# Patient Record
Sex: FEMALE | NOT HISPANIC OR LATINO | Employment: FULL TIME | URBAN - NONMETROPOLITAN AREA
[De-identification: names, ages, dates, MRNs, and addresses within clinical notes are randomized per-mention and may not be internally consistent; named-entity substitution may affect disease eponyms.]

---

## 2018-08-28 ENCOUNTER — EVALUATION (OUTPATIENT)
Dept: PHYSICAL THERAPY | Facility: CLINIC | Age: 48
End: 2018-08-28
Payer: COMMERCIAL

## 2018-08-28 DIAGNOSIS — S76.312D RUPTURE OF LEFT PROXIMAL HAMSTRING TENDON, SUBSEQUENT ENCOUNTER: Primary | ICD-10-CM

## 2018-08-28 PROCEDURE — 97010 HOT OR COLD PACKS THERAPY: CPT | Performed by: PHYSICAL THERAPIST

## 2018-08-28 PROCEDURE — G8979 MOBILITY GOAL STATUS: HCPCS | Performed by: PHYSICAL THERAPIST

## 2018-08-28 PROCEDURE — 97110 THERAPEUTIC EXERCISES: CPT | Performed by: PHYSICAL THERAPIST

## 2018-08-28 PROCEDURE — 97161 PT EVAL LOW COMPLEX 20 MIN: CPT | Performed by: PHYSICAL THERAPIST

## 2018-08-28 PROCEDURE — G8978 MOBILITY CURRENT STATUS: HCPCS | Performed by: PHYSICAL THERAPIST

## 2018-08-28 PROCEDURE — 97140 MANUAL THERAPY 1/> REGIONS: CPT | Performed by: PHYSICAL THERAPIST

## 2018-08-28 NOTE — PROGRESS NOTES
PT Evaluation     Today's date: 2018  Patient name: Mundo Hilario  : 1970  MRN: [id number]  Referring provider: Kg Michaels MD  Dx:   Encounter Diagnosis     ICD-10-CM    1  Rupture of left proximal hamstring tendon, subsequent encounter S76 312D                   Assessment    Assessment details:   CURRENT FUNCTIONAL STATUS    Standing/ADL tolerance: Several hours  Walking tolerance: 1 mile  Ascends/descends stairs reciprocally  Difficulty arising from sitting: None  Able to squat fully with mild soreness and tightness  Unable to run  SHORT TERM GOALS (2 WEEKS)    Decrease left hamstring tightness  Increase hip and knee 3-5 strength lbs in all weak areas  Decrease pain to 0-3/10  Able to squat fully with minimal soreness and tightness  Refrain from running  LONG TERM GOALS (DISCHARGE)    Hip AROM: WNL in all planes  Resolve left hamstring tightness  Hip Strength: F=34 lbs, ABD=35 lbs, Ext=26 lbs  Knee Strength: E=49 lbs, F=44 lbs  Decrease pain to 0-1/10  Able to squat fully without soreness and tightness  Able to run  Understanding of Dx/Px/POC: good   Prognosis: good    Goals  See assessment details above  Plan  Planned modality interventions: cryotherapy  Planned therapy interventions: manual therapy, therapeutic activities and therapeutic exercise  Frequency: 2x week  Duration in weeks: 4  Plan details: Mundo Hilario is a 41y o  year old female presenting to PT with pain, decreased strength, and decreased tolerance to activity  This patient would benefit from skilled PT services to address these issues and to maximize function  A home exercise program was provided and all questions were answered  Thank you for the referral           Subjective Evaluation    History of Present Illness  Mechanism of injury: CC: Left hamstring pain and tightness, unable to run    HPI: The patient was attempting to do a cart wheel last August when she felt a pop in her left hamstring  Testing revealed a proximal hamstring tear  She had surgery on 2018 to repair it  She was NWB using 2 crutches and a brace for 4 weeks  She then gradually weaned herself from them  She states that she has no PT restrictions, but was told to refrain from running for another 2 months  She was having PT closer to her home, but since she has now returned to work she is continuing with treatment at this facility  Pain  No pain reported  Current pain ratin  At best pain ratin  At worst pain ratin    Patient Goals  Patient goals for therapy: increased strength, increased motion, decreased pain and return to sport/leisure activities          Objective     Observations     Additional Observation Details  Gait is normal on levels and stairs  General Comments     Hip Comments   CURRENT OBJECTIVE MEASUREMENTS    Hip AROM:WNL in all planes  Hip Strength: F=21 lbs, ABD=21 lbs, EXT=16 lbs  Knee Strength: E=44 lbs, F=25 lbs  Moderate left hamstring tightness                Precautions: None    Daily Treatment Diary     Manual          Left hamstring stretch RK                 Exercise Diary          Elliptical L 1 6 '        HEP: SKTC and hamstring stretch supine 5"x10 TID        Mini squats         Side Stepping         Steps Forward         Steps Lateral         Hip Patterns Standing         Multi-Hip: F , P          FLEX/EXT          ABD/ADD         Leg Press: S         XO TKE         PYR QUAD: S P , C         PYR HAM: S  P , C                  Modalities         CP 10'

## 2018-08-28 NOTE — LETTER
2018    Jonathon Vanessa MD  [address] [facility] [address]    Patient: Tobias Lucia   YOB: 1970   Date of Visit: 2018     Encounter Diagnosis     ICD-10-CM    1  Rupture of left proximal hamstring tendon, subsequent encounter S76 312D        Dear Dr Naomi Curran:    Please review the attached Plan of Care from [facility] recent visit  Please verify that you agree therapy should continue by signing the attached document and sending it back to our office  If you have any questions or concerns, please don't hesitate to call  Sincerely,    Amy Abreu, PT      Referring Provider:      I certify that I have read the below Plan of Care and certify the need for these services furnished under this plan of treatment while under my care  Jonathon Vanessa MD  [address] [facility] [place]  [address] [place] [address]: [phone]          PT Evaluation     Today's date: 2018  Patient name: Tobias Lucia  : 1970  MRN: [id number]  Referring provider: Mariya Mackenzie MD  Dx:   Encounter Diagnosis     ICD-10-CM    1  Rupture of left proximal hamstring tendon, subsequent encounter S76 312D                   Assessment    Assessment details:   CURRENT FUNCTIONAL STATUS    Standing/ADL tolerance: Several hours  Walking tolerance: 1 mile  Ascends/descends stairs reciprocally  Difficulty arising from sitting: None  Able to squat fully with mild soreness and tightness  Unable to run  SHORT TERM GOALS (2 WEEKS)    Decrease left hamstring tightness  Increase hip and knee 3-5 strength lbs in all weak areas  Decrease pain to 0-3/10  Able to squat fully with minimal soreness and tightness  Refrain from running  LONG TERM GOALS (DISCHARGE)    Hip AROM: WNL in all planes  Resolve left hamstring tightness  Hip Strength: F=34 lbs, ABD=35 lbs, Ext=26 lbs  Knee Strength: E=49 lbs, F=44 lbs    Decrease pain to 0-1/10  Able to squat fully without soreness and tightness  Able to run  Understanding of Dx/Px/POC: good   Prognosis: good    Goals  See assessment details above  Plan  Planned modality interventions: cryotherapy  Planned therapy interventions: manual therapy, therapeutic activities and therapeutic exercise  Frequency: 2x week  Duration in weeks: 4  Plan details: Sushila Dewitt is a 41y o  year old female presenting to PT with pain, decreased strength, and decreased tolerance to activity  This patient would benefit from skilled PT services to address these issues and to maximize function  A home exercise program was provided and all questions were answered  Thank you for the referral           Subjective Evaluation    History of Present Illness  Mechanism of injury: CC: Left hamstring pain and tightness, unable to run  HPI: The patient was attempting to do a cart wheel last August when she felt a pop in her left hamstring  Testing revealed a proximal hamstring tear  She had surgery on 2018 to repair it  She was NWB using 2 crutches and a brace for 4 weeks  She then gradually weaned herself from them  She states that she has no PT restrictions, but was told to refrain from running for another 2 months  She was having PT closer to her home, but since she has now returned to work she is continuing with treatment at this facility  Pain  No pain reported  Current pain ratin  At best pain ratin  At worst pain ratin    Patient Goals  Patient goals for therapy: increased strength, increased motion, decreased pain and return to sport/leisure activities          Objective     Observations     Additional Observation Details  Gait is normal on levels and stairs  General Comments     Hip Comments   CURRENT OBJECTIVE MEASUREMENTS    Hip AROM:WNL in all planes  Hip Strength: F=21 lbs, ABD=21 lbs, EXT=16 lbs  Knee Strength: E=44 lbs, F=25 lbs      Moderate left hamstring tightness                Precautions: None    Daily Treatment Diary     Manual  8/28        Left hamstring stretch RK                 Exercise Diary          Elliptical L 1 6 '        HEP: SKTC and hamstring stretch supine 5"x10 TID        Mini squats         Side Stepping         Steps Forward         Steps Lateral         Hip Patterns Standing         Multi-Hip: F , P          FLEX/EXT          ABD/ADD         Leg Press: S         XO TKE         PYR QUAD: S P , C         PYR HAM: S  P , C                  Modalities         CP 10'

## 2018-08-30 ENCOUNTER — APPOINTMENT (OUTPATIENT)
Dept: PHYSICAL THERAPY | Facility: CLINIC | Age: 48
End: 2018-08-30
Payer: COMMERCIAL

## 2018-08-31 ENCOUNTER — OFFICE VISIT (OUTPATIENT)
Dept: PHYSICAL THERAPY | Facility: CLINIC | Age: 48
End: 2018-08-31
Payer: COMMERCIAL

## 2018-08-31 DIAGNOSIS — S76.312D RUPTURE OF LEFT PROXIMAL HAMSTRING TENDON, SUBSEQUENT ENCOUNTER: Primary | ICD-10-CM

## 2018-08-31 PROCEDURE — 97140 MANUAL THERAPY 1/> REGIONS: CPT

## 2018-08-31 PROCEDURE — 97110 THERAPEUTIC EXERCISES: CPT

## 2018-08-31 PROCEDURE — 97010 HOT OR COLD PACKS THERAPY: CPT

## 2018-08-31 NOTE — PROGRESS NOTES
Daily Note     Today's date: 2018  Patient name: Marybeth Payne  : 1970  MRN: [id number]  Referring provider: Ross Rodriguze MD  Dx:   Encounter Diagnosis     ICD-10-CM    1  Rupture of left proximal hamstring tendon, subsequent encounter S76 312D                   Subjective: Patient reports compliance with the home stretching/exercise program       Objective: See treatment diary below  The exercise program was progressed  Assessment: Tolerated treatment well   Patient exhibited good technique with therapeutic exercises      Plan: Continue per plan of care         Precautions: None     Daily Treatment Diary      Manual             Left hamstring stretch RK  LF                           Exercise Diary                Elliptical L 1 6 '  L 1 10'           HEP: SKTC and hamstring stretch supine 5"x10 TID             Mini squats               Side Stepping               Steps Forward               Steps Lateral               Lunges  2/10       SLS Foam w/ cone touch  20X       Ball Bridge, curl                                 Multi-Hip: F6 , P3    P 2 2/10 L            FLEX/EXT                ABD/ADD               Leg Press: S10    P 2 2/10           XO TKE               PYR QUAD: S3 P4 , C    P 3 2/10           PYR HAM: S8  P5 , C    P 3 2/10                           Modalities               CP 10'

## 2018-09-04 ENCOUNTER — APPOINTMENT (OUTPATIENT)
Dept: PHYSICAL THERAPY | Facility: CLINIC | Age: 48
End: 2018-09-04
Payer: COMMERCIAL

## 2018-09-05 ENCOUNTER — OFFICE VISIT (OUTPATIENT)
Dept: PHYSICAL THERAPY | Facility: CLINIC | Age: 48
End: 2018-09-05
Payer: COMMERCIAL

## 2018-09-05 DIAGNOSIS — S76.312D RUPTURE OF LEFT PROXIMAL HAMSTRING TENDON, SUBSEQUENT ENCOUNTER: Primary | ICD-10-CM

## 2018-09-05 PROCEDURE — 97140 MANUAL THERAPY 1/> REGIONS: CPT | Performed by: PHYSICAL THERAPIST

## 2018-09-05 PROCEDURE — 97010 HOT OR COLD PACKS THERAPY: CPT | Performed by: PHYSICAL THERAPIST

## 2018-09-05 PROCEDURE — 97110 THERAPEUTIC EXERCISES: CPT | Performed by: PHYSICAL THERAPIST

## 2018-09-05 NOTE — PROGRESS NOTES
Daily Note     Today's date: 2018  Patient name: Charisse Montemayor  : 1970  MRN: [id number]  Referring provider: Ml Gallegos MD  Dx:   Encounter Diagnosis     ICD-10-CM    1  Rupture of left proximal hamstring tendon, subsequent encounter S76 312D                   Subjective: Buttock pain and tenderness are gradually improving  Objective: See treatment diary below      Assessment: Tolerated treatment well  Patient exhibited good technique with therapeutic exercises      Plan: Progress treatment as tolerated        Precautions: None     Daily Treatment Diary      Manual           Left hamstring stretch RK  LF  RK         Left gluteal and piriformis stretch      RK         Exercise Diary                Elliptical L 1 6 '  L 1 10'  L 1 10'         HEP: SKTC and hamstring stretch supine 5"x10 TID    HEP         Mini squats               Side Stepping               Steps Forward               Steps Lateral               Lunges   2/10  3/10         SLS Foam w/ cone touch   20X  30x         Ball Bridge, curl                                               Multi-Hip: F6, P3    P 2 2/10 L           FLEX/EXT      P 2 3/10          ABD/ADD      P 2 3/10         Leg Press: S10    P 2 2/10  P 3 3/10         XO TKE               PYR QUAD:   S 3  P4 , C 1    P 3 2/10  P 3 3/10         PYR HAM: S 8  P5 , C 7    P 3 2/10 P 3 3/10                         Modalities               CP 10'    10'

## 2018-09-06 ENCOUNTER — APPOINTMENT (OUTPATIENT)
Dept: PHYSICAL THERAPY | Facility: CLINIC | Age: 48
End: 2018-09-06
Payer: COMMERCIAL

## 2018-09-07 ENCOUNTER — OFFICE VISIT (OUTPATIENT)
Dept: PHYSICAL THERAPY | Facility: CLINIC | Age: 48
End: 2018-09-07
Payer: COMMERCIAL

## 2018-09-07 DIAGNOSIS — S76.312D RUPTURE OF LEFT PROXIMAL HAMSTRING TENDON, SUBSEQUENT ENCOUNTER: Primary | ICD-10-CM

## 2018-09-07 PROCEDURE — 97110 THERAPEUTIC EXERCISES: CPT | Performed by: PHYSICAL THERAPIST

## 2018-09-07 PROCEDURE — 97140 MANUAL THERAPY 1/> REGIONS: CPT | Performed by: PHYSICAL THERAPIST

## 2018-09-07 NOTE — PROGRESS NOTES
Daily Note     Today's date: 2018  Patient name: Denice Hubbard  : 1970  MRN: [id number]  Referring provider: Chelsea Acosta MD  Dx:   Encounter Diagnosis     ICD-10-CM    1  Rupture of left proximal hamstring tendon, subsequent encounter S76 312D                   Subjective: Patient states that she had soreness rated at 6/10 for several hours after the last session  Objective: See treatment diary below      Assessment: Tolerated treatment well  Patient demonstrated fatigue post treatment      Plan: Progress treatment as tolerated          Precautions: None     Daily Treatment Diary      Manual         Left hamstring stretch RK  LF  RK  RK       Left gluteal and piriformis stretch      RK  RK       Exercise Diary                Elliptical L 1 6 '  L 1 10'  L 1 10'  L 1 10'       HEP: SKTC and hamstring stretch supine 5"x10 TID    HEP         Mini squats               Side Stepping               Steps Forward               Steps Lateral        6" 20x       Lunges   2/10  3/10  3/10       SLS Foam w/ cone touch   20X  30x  30x       Ball Bridge, curl                                               Multi-Hip: F6, P3    P 2 2/10 L            FLEX/EXT      P 2 3/10  P 2 3/10 B        ABD/ADD      P 2 3/10  P 2 3/10 B       Leg Press: S10    P 2 2/10  P 3 3/10  P 3 3/10       XO TKE               PYR QUAD:   S 3  P4 , C 1    P 3 2/10  P 3 3/10  P 3 3/10       PYR HAM: S 8  P5 , C 7    P 3 2/10 P 3 3/10  P 4 3/10                       Modalities               CP 10'    10'

## 2018-09-10 ENCOUNTER — OFFICE VISIT (OUTPATIENT)
Dept: PHYSICAL THERAPY | Facility: CLINIC | Age: 48
End: 2018-09-10
Payer: COMMERCIAL

## 2018-09-10 DIAGNOSIS — S76.312D RUPTURE OF LEFT PROXIMAL HAMSTRING TENDON, SUBSEQUENT ENCOUNTER: Primary | ICD-10-CM

## 2018-09-10 PROCEDURE — 97140 MANUAL THERAPY 1/> REGIONS: CPT

## 2018-09-10 PROCEDURE — 97110 THERAPEUTIC EXERCISES: CPT

## 2018-09-10 NOTE — PROGRESS NOTES
Daily Note     Today's date: 9/10/2018  Patient name: Nolberto Armando  : 1970  MRN: [id number]  Referring provider: Michel Parker MD  Dx:   Encounter Diagnosis     ICD-10-CM    1  Rupture of left proximal hamstring tendon, subsequent encounter S76 502D                   Subjective: Patient reports she did not have increased soreness post last session, and felt good all weekend  Objective: See treatment diary below      Assessment: Tolerated treatment well  Patient exhibited good technique with therapeutic exercises      Plan: Continue per plan of care         Precautions: None     Daily Treatment Diary      Manual  8/28  8/31  9/5  9/7  9/10     Left hamstring stretch RK  LF  RK  RK  LF     Left gluteal and piriformis stretch      RK  RK  LF     Exercise Diary                Elliptical L 1 6 '  L 1 10'  L 1 10'  L 1 10'  L 1 10'     HEP: SKTC and hamstring stretch supine 5"x10 TID    HEP         Mini squats               Side Stepping               Steps Forward               Steps Lateral        6" 20x  6" 20X     Lunges   2/10  3/10  3/10  walking 20X     SLS Foam w/ cone touch   20X  30x  30x  30X     Ball Bridge, curl          15X                                     Multi-Hip: F6, P3    P 2 2/10 L            FLEX/EXT      P 2 3/10  P 2 3/10 B  P 3 3/10      ABD/ADD      P 2 3/10  P 2 3/10 B  P 3 3/10     Leg Press: S10    P 2 2/10  P 3 3/10  P 3 3/10  P 3 3/10     XO TKE               PYR QUAD:   S 3  P4 , C 1    P 3 2/10  P 3 3/10  P 3 3/10  P 3 3/10     PYR HAM: S 8  P5 , C 7    P 3 2/10 P 3 3/10  P 4 3/10  P 4 3/10                     Modalities               CP 10'    10'    Declined

## 2018-09-12 ENCOUNTER — APPOINTMENT (OUTPATIENT)
Dept: PHYSICAL THERAPY | Facility: CLINIC | Age: 48
End: 2018-09-12
Payer: COMMERCIAL

## 2018-09-17 ENCOUNTER — OFFICE VISIT (OUTPATIENT)
Dept: PHYSICAL THERAPY | Facility: CLINIC | Age: 48
End: 2018-09-17
Payer: COMMERCIAL

## 2018-09-17 DIAGNOSIS — S76.312D RUPTURE OF LEFT PROXIMAL HAMSTRING TENDON, SUBSEQUENT ENCOUNTER: Primary | ICD-10-CM

## 2018-09-17 PROCEDURE — 97140 MANUAL THERAPY 1/> REGIONS: CPT

## 2018-09-17 PROCEDURE — 97110 THERAPEUTIC EXERCISES: CPT

## 2018-09-17 NOTE — PROGRESS NOTES
Daily Note     Today's date: 2018  Patient name: Najma Jameson  : 1970  MRN: [id number]  Referring provider: Lucio Hernandes MD  Dx:   Encounter Diagnosis     ICD-10-CM    1  Rupture of left proximal hamstring tendon, subsequent encounter S76 312D                   Subjective: Patient reports her hamstring area is significantly improved since beginning treatment  Objective: See treatment diary below      Assessment: Tolerated treatment well  Patient exhibited good technique with therapeutic exercises      Plan: Continue per plan of care       Precautions: None     Daily Treatment Diary      Manual  8/28  8/31  9/5  9/7  9/10  9/17   Left hamstring stretch RK  LF  RK  RK  LF  LF   Left gluteal and piriformis stretch      RK  RK  LF  LF   Exercise Diary                Elliptical L 1 6 '  L 1 10'  L 1 10'  L 1 10'  L 1 10'  L 1 10'   HEP: SKTC and hamstring stretch supine 5"x10 TID    HEP         Mini squats               Side Stepping               Steps Forward               Steps Lateral        6" 20x  6" 20X  6" 20X   Lunges   2/10  3/10  3/10  walking 20X  walking 20X   SLS Foam w/ cone touch   20X  30x  30x  30X  30X   Ball Bridge, curl          15X  2/10                                   Multi-Hip: F6, P3    P 2 2/10 L            FLEX/EXT      P 2 3/10  P 2 3/10 B  P 3 3/10  P 3 3/10    ABD/ADD      P 2 3/10  P 2 3/10 B  P 3 3/10  P 3 3/10   Leg Press: S10    P 2 2/10  P 3 3/10  P 3 3/10  P 3 3/10  P 3 3/10   XO TKE               PYR QUAD:   S 3  P4 , C 1    P 3 2/10  P 3 3/10  P 3 3/10  P 3 3/10  P 3 3/10   PYR HAM: S 8  P5 , C 7    P 3 2/10 P 3 3/10  P 4 3/10  P 4 3/10  P 4 3/10                   Modalities               CP 10'    10'    Declined

## 2018-09-18 NOTE — PROGRESS NOTES
PT Re-Evaluation     Today's date: 2018  Patient name: Letitia Amezquita  : 1970  MRN: [id number]  Referring provider: Allie Crowley MD  Dx:   Encounter Diagnosis     ICD-10-CM    1  Rupture of left proximal hamstring tendon, subsequent encounter S76 312D                   Assessment    Assessment details:   CURRENT FUNCTIONAL STATUS    Standing/ADL tolerance: 4 hours  Walking tolerance: 1 mile  Ascends/descends stairs reciprocally  Difficulty arising from sitting: None  Able to squat fully with mild soreness and tightness  Has not attempted running  SHORT TERM GOALS (2 WEEKS)    Decrease left hamstring tightness  Increase hip and knee 3-5 strength lbs in all weak areas  Decrease pain to 0-2/10  Able to squat fully with minimal soreness and tightness  Gradual return to running  LONG TERM GOALS (DISCHARGE)    Hip AROM: WNL in all planes  Resolve left hamstring tightness  Hip Strength: F=34 lbs, ABD=35 lbs, Ext=26 lbs  Knee Strength: E=49 lbs, F=44 lbs  Decrease pain to 0-1/10  Able to squat fully without soreness and tightness  Able to run  Understanding of Dx/Px/POC: good   Prognosis: good    Goals  See assessment details above  Plan  Planned modality interventions: cryotherapy  Planned therapy interventions: manual therapy, therapeutic activities and therapeutic exercise  Frequency: 2x week  Duration in weeks: 4  Plan details: The patient has shown improvement in PT demonstrating decreased pain, increased range of motion, increased strength, and increased tolerance to activity  The patient continues to present with pain, decreased ROM, decreased strength, and decreased tolerance to activity  The patient would benefit from continued skilled PT services to address these issues and to maximize function  Subjective Evaluation    History of Present Illness  Mechanism of injury: Subjective:  The patient's left buttock pain has decreased in intensity, but it remains constant  She has an improved tolerance for fast walking, sitting, and squatting  As per her surgeon's orders, she has not attempted running yet  Pain  No pain reported  Current pain rating: 3  At best pain ratin  At worst pain ratin    Patient Goals  Patient goals for therapy: increased strength, increased motion, decreased pain and return to sport/leisure activities          Objective     Observations     Additional Observation Details  Gait is normal on levels and stairs  General Comments     Hip Comments   CURRENT OBJECTIVE MEASUREMENTS    Hip AROM:WNL in all planes  Hip Strength: F=33 lbs, ABD=34 lbs, EXT=24 lbs  Knee Strength: E=45 lbs, F=27 lbs  Mild left hamstring tightness              Precautions: None     Daily Treatment Diary      Manual  9/19  8/31  9/5  9/7  9/10  9/17   Left hamstring stretch RK  LF  RK  RK  LF  LF   Left gluteal and piriformis stretch  RK    RK  RK  LF  LF   Exercise Diary                Elliptical L 1 10 '  L 1 10'  L 1 10'  L 1 10'  L 1 10'  L 1 10'   HEP: SKTC and hamstring stretch supine     HEP         Mini squats               Side Stepping               Steps Forward               Steps Lateral  6" 20x      6" 20x  6" 20X  6" 20X   Lunges  walking 20x 5 lb 2/10  3/10  3/10  walking 20X  walking 20X   SLS Foam w/ cone touch  30x 20X  30x  30x  30X  30X   Ball Bridge, curl  10        15X  2/10                                   Multi-Hip: F6, P3    P 2 2/10 L            FLEX/EXT  P 3 5 3/10    P 2 3/10  P 2 3/10 B  P 3 3/10  P 3 3/10    ABD/ADD  P 3 5 3/10    P 2 3/10  P 2 3/10 B  P 3 3/10  P 3 3/10   Leg Press: S10  P 3 3/10  P 2 2/10  P 3 3/10  P 3 3/10  P 3 3/10  P 3 3/10   XO TKE               PYR QUAD:   S 3  P4 , C 1  P 3 3/10  P 3 2/10  P 3 3/10  P 3 3/10  P 3 3/10  P 3 3/10   PYR HAM: S 8  P5 , C 7  P 4 5 3/10  P 3 2/10 P 3 3/10  P 4 3/10  P 4 3/10  P 4 3/10                   Modalities               CP     10'    Declined

## 2018-09-19 ENCOUNTER — EVALUATION (OUTPATIENT)
Dept: PHYSICAL THERAPY | Facility: CLINIC | Age: 48
End: 2018-09-19
Payer: COMMERCIAL

## 2018-09-19 DIAGNOSIS — S76.312D RUPTURE OF LEFT PROXIMAL HAMSTRING TENDON, SUBSEQUENT ENCOUNTER: Primary | ICD-10-CM

## 2018-09-19 PROCEDURE — 97140 MANUAL THERAPY 1/> REGIONS: CPT | Performed by: PHYSICAL THERAPIST

## 2018-09-19 PROCEDURE — 97110 THERAPEUTIC EXERCISES: CPT | Performed by: PHYSICAL THERAPIST

## 2018-09-24 ENCOUNTER — OFFICE VISIT (OUTPATIENT)
Dept: PHYSICAL THERAPY | Facility: CLINIC | Age: 48
End: 2018-09-24
Payer: COMMERCIAL

## 2018-09-24 DIAGNOSIS — S76.312D RUPTURE OF LEFT PROXIMAL HAMSTRING TENDON, SUBSEQUENT ENCOUNTER: Primary | ICD-10-CM

## 2018-09-24 PROCEDURE — 97110 THERAPEUTIC EXERCISES: CPT

## 2018-09-24 PROCEDURE — 97140 MANUAL THERAPY 1/> REGIONS: CPT

## 2018-09-24 NOTE — PROGRESS NOTES
Daily Note     Today's date: 2018  Patient name: Najma Jameson  : 1970  MRN: [id number]  Referring provider: Lucio Hernandes MD  Dx:   Encounter Diagnosis     ICD-10-CM    1  Rupture of left proximal hamstring tendon, subsequent encounter S76 312D                   Subjective: Patient reports she continues to massage the hamstring area, and it is feeling pretty good  Objective: See treatment diary below      Assessment: Tolerated treatment well   Patient exhibited good technique with therapeutic exercises      Plan: Continue per plan of care       Precautions: None     Daily Treatment Diary      Manual  9/19  9/24  9/5  9/7  9/10  9/17   Left hamstring stretch RK  LF  RK  RK  LF  LF   Left gluteal and piriformis stretch  RK  LF  RK  RK  LF  LF   Exercise Diary                Elliptical L 1 10 '  L 1 10'  L 1 10'  L 1 10'  L 1 10'  L 1 10'   HEP: SKTC and hamstring stretch supine      HEP         Mini squats               Side Stepping               Steps Forward               Steps Lateral  6" 20x  8" 2/10    6" 20x  6" 20X  6" 20X   Lunges  walking 20x 5 lb 5# 20X  3/10  3/10  walking 20X  walking 20X   SLS Foam w/ cone touch  30x 20X  30x  30x  30X  30X   Ball Bridge, curl  2/10  2/10      15X  2/10                                   Multi-Hip: F6, P3    P 3 5 3/10             FLEX/EXT  P 3 5 3/10    P 2 3/10  P 2 3/10 B  P 3 3/10  P 3 3/10    ABD/ADD  P 3 5 3/10    P 2 3/10  P 2 3/10 B  P 3 3/10  P 3 3/10   Leg Press: S10  P 3 3/10  P 3 3/10  P 3 3/10  P 3 3/10  P 3 3/10  P 3 3/10   XO TKE               PYR QUAD:   S 3  P4 , C 1  P 3 3/10  P 4 3/10  P 3 3/10  P 3 3/10  P 3 3/10  P 3 3/10   PYR HAM: S 8  P5 , C 7  P 4 5 3/10  P 5 5 3/10 P 3 3/10  P 4 3/10  P 4 3/10  P 4 3/10                   Modalities               CP      10'    Declined

## 2018-09-26 ENCOUNTER — OFFICE VISIT (OUTPATIENT)
Dept: PHYSICAL THERAPY | Facility: CLINIC | Age: 48
End: 2018-09-26
Payer: COMMERCIAL

## 2018-09-26 DIAGNOSIS — S76.312D RUPTURE OF LEFT PROXIMAL HAMSTRING TENDON, SUBSEQUENT ENCOUNTER: Primary | ICD-10-CM

## 2018-09-26 PROCEDURE — 97010 HOT OR COLD PACKS THERAPY: CPT

## 2018-09-26 PROCEDURE — 97110 THERAPEUTIC EXERCISES: CPT

## 2018-09-26 NOTE — PROGRESS NOTES
Daily Note     Today's date: 2018  Patient name: Clint Gallego  : 1970  MRN: [id number]  Referring provider: Ketty Mckeon MD  Dx:   Encounter Diagnosis     ICD-10-CM    1  Rupture of left proximal hamstring tendon, subsequent encounter S76 312D                   Subjective: Patient reports she is doing good with occasional mild soreness  Objective: See treatment diary below      Assessment: Tolerated treatment well  Patient exhibited good technique with therapeutic exercises      Plan: Continue per plan of care         Precautions: None     Daily Treatment Diary      Manual  9/19  9/24  9/26  9/7  9/10  9/17   Left hamstring stretch RK  LF    RK  LF  LF   Left gluteal and piriformis stretch  RK  LF    RK  LF  LF   Exercise Diary                Elliptical L 1 10 '  L 1 10'  L 1 10'  L 1 10'  L 1 10'  L 1 10'   HEP: SKTC and hamstring stretch supine      HEP         Mini squats      10# KB 3/10         Side Stepping               Steps Forward               Steps Lateral  6" 20x  8" 2/10  8" 2/10  6" 20x  6" 20X  6" 20X   Lunges  walking 20x 5 lb 5# 20X  5# 20X  3/10  walking 20X  walking 20X   SLS Foam w/ cone touch  30x 20X  30x  30x  30X  30X   Ball Bridge, curl  2/10  2/10  2/10    15X  2/10                                   Multi-Hip: F6, P3    P 3 5 3/10   P 3 5 3/10          FLEX/EXT  P 3 5 3/10       P 2 3/10 B  P 3 3/10  P 3 3/10    ABD/ADD  P 3 5 3/10      P 2 3/10 B  P 3 3/10  P 3 3/10   Leg Press: S8  P 3 3/10  P 3 3/10  P 4 3/10  P 3 3/10  P 3 3/10  P 3 3/10   XO TKE               PYR QUAD:   S 3  P4 , C 1  P 3 3/10  P 4 3/10  P 4 3/10  P 3 3/10  P 3 3/10  P 3 3/10   PYR HAM: S 8  P5 , C 7  P 4 5 3/10  P 5 5 3/10 P 5 5 3/10  P 4 3/10  P 4 3/10  P 4 3/10                   Modalities               CP      10'    Declined

## 2018-10-01 ENCOUNTER — OFFICE VISIT (OUTPATIENT)
Dept: PHYSICAL THERAPY | Facility: CLINIC | Age: 48
End: 2018-10-01
Payer: COMMERCIAL

## 2018-10-01 DIAGNOSIS — S76.312D RUPTURE OF LEFT PROXIMAL HAMSTRING TENDON, SUBSEQUENT ENCOUNTER: Primary | ICD-10-CM

## 2018-10-01 PROCEDURE — 97140 MANUAL THERAPY 1/> REGIONS: CPT

## 2018-10-01 PROCEDURE — 97110 THERAPEUTIC EXERCISES: CPT

## 2018-10-01 NOTE — PROGRESS NOTES
Daily Note     Today's date: 10/1/2018  Patient name: Juan Waddell  : 1970  MRN: [id number]  Referring provider: Francis Acuna MD  Dx:   Encounter Diagnosis     ICD-10-CM    1  Rupture of left proximal hamstring tendon, subsequent encounter S76 312D                   Subjective: Patient reports overall improvement in the L hamstring, ie: strength and pain, but she feels weakness at times and discomfort whenn sitting  Objective: See treatment diary below      Assessment: Tolerated treatment well   Patient exhibited good technique with therapeutic exercises      Plan: Continue per plan of care         Precautions: None     Daily Treatment Diary      Manual  9/19  9/24  9/26  10/1  9/10  9/17   Left hamstring stretch RK  LF    LF  LF  LF   Left gluteal and piriformis stretch  RK  LF      LF  LF   Exercise Diary                Elliptical L 1 10 '  L 1 10'  L 1 10'  L 1 10'  L 1 10'  L 1 10'   HEP: SKTC and hamstring stretch supine      HEP         Mini squats      10# KB 3/10  10#KB 3/10       Side Stepping               Steps Forward               Steps Lateral  6" 20x  8" 2/10  8" 2/10  8" 20x  6" 20X  6" 20X   Lunges  walking 20x 5 lb 5# 20X  5# 20X  5# 3/10  walking 20X  walking 20X   SLS Foam w/ cone touch  30x 20X  30x  30x  30X  30X   Ball Bridge, curl  /10  2/10  2/10  2/10  15X  2/10                                   Multi-Hip: F6, P3    P 3 5 3/10   P 3 5 3/10  P 4 3/10        FLEX/EXT  P 3 5 3/10       P 4 3/10 B  P 3 3/10  P 3 3/10    ABD/ADD  P 3 5 3/10      P 4 3/10 B  P 3 3/10  P 3 3/10   Leg Press: S8  P 3 3/10  P 3 3/10  P 4 3/10  P 4 3/10  P 3 3/10  P 3 3/10   XO TKE               PYR QUAD:   S 3  P4 , C 1  P 3 3/10  P 4 3/10  P 4 3/10  P 4 3/10  P 3 3/10  P 3 3/10   PYR HAM: S 8  P5 , C 7  P 4 5 3/10  P 5 5 3/10 P 5 5 3/10  P 6 3/10  P 4 3/10  P 4 3/10                   Modalities               CP      10'    Declined

## 2018-10-02 ENCOUNTER — APPOINTMENT (OUTPATIENT)
Dept: PHYSICAL THERAPY | Facility: CLINIC | Age: 48
End: 2018-10-02
Payer: COMMERCIAL

## 2018-10-05 ENCOUNTER — OFFICE VISIT (OUTPATIENT)
Dept: PHYSICAL THERAPY | Facility: CLINIC | Age: 48
End: 2018-10-05
Payer: COMMERCIAL

## 2018-10-05 DIAGNOSIS — S76.312D RUPTURE OF LEFT PROXIMAL HAMSTRING TENDON, SUBSEQUENT ENCOUNTER: Primary | ICD-10-CM

## 2018-10-05 PROCEDURE — 97110 THERAPEUTIC EXERCISES: CPT

## 2018-10-05 NOTE — PROGRESS NOTES
Daily Note     Today's date: 10/5/2018  Patient name: Mary Ann Pete  : 1970  MRN: [id number]  Referring provider: Tevin Mayes MD  Dx:   Encounter Diagnosis     ICD-10-CM    1  Rupture of left proximal hamstring tendon, subsequent encounter S76 312D                   Subjective: Patient was to MD for F/U  She reports he was pleased with progress and she is to continue treatment  Objective: See treatment diary below      Assessment: Tolerated treatment well   Patient exhibited good technique with therapeutic exercises      Plan: Continue per plan of care         Precautions: None     Daily Treatment Diary      Manual  9/19  9/24  9/26  10/1  10/5  9/17   Left hamstring stretch RK  LF    LF    LF   Left gluteal and piriformis stretch  RK  LF        LF   Exercise Diary                Elliptical L 1 10 '  L 1 10'  L 1 10'  L 1 10'  L 1 10'  L 1 10'   HEP: SKTC and hamstring stretch supine      HEP         Mini squats      10# KB 3/10  10#KB 3/10  10# 3/10     Side Stepping               Steps Forward               Steps Lateral  6" 20x  8" 2/10  8" 2/10  8" 20x  8" 20X  6" 20X   Lunges  walking 20x 5 lb 5# 20X  5# 20X  5# 3/10  walking 20X  walking 20X   SLS Foam w/ cone touch  30x 20X  30x    30X  30X   SL Deadlift     5# KB 10X    Ball Bridge, curl  2/10  2/10  2/10  2/10  2/10  2/10                                   Multi-Hip: F6, P3    P 3 5 3/10   P 3 5 3/10  P 4 3/10        FLEX/EXT  P 3 5 3/10       P 4 3/10 B  P 4 3/10  P 3 3/10    ABD/ADD  P 3 5 3/10      P 4 3/10 B  P 4 3/10  P 3 3/10   Leg Press: S8  P 3 3/10  P 3 3/10  P 4 3/10  P 4 3/10  P 4 3/10  P 3 3/10   XO TKE               PYR QUAD:   S 3  P4 , C 1  P 3 3/10  P 4 3/10  P 4 3/10  P 4 3/10  P 4 3/10  P 3 3/10   PYR HAM: S 8  P5 , C 7  P 4 5 3/10  P 5 5 3/10 P 5 5 3/10  P 6 3/10  P 6 3/10  P 4 3/10                   Modalities               CP      10'    Declined

## 2018-10-08 ENCOUNTER — APPOINTMENT (OUTPATIENT)
Dept: PHYSICAL THERAPY | Facility: CLINIC | Age: 48
End: 2018-10-08
Payer: COMMERCIAL

## 2018-10-09 ENCOUNTER — OFFICE VISIT (OUTPATIENT)
Dept: PHYSICAL THERAPY | Facility: CLINIC | Age: 48
End: 2018-10-09
Payer: COMMERCIAL

## 2018-10-09 DIAGNOSIS — S76.312D RUPTURE OF LEFT PROXIMAL HAMSTRING TENDON, SUBSEQUENT ENCOUNTER: Primary | ICD-10-CM

## 2018-10-09 PROCEDURE — 97140 MANUAL THERAPY 1/> REGIONS: CPT

## 2018-10-09 PROCEDURE — 97110 THERAPEUTIC EXERCISES: CPT

## 2018-10-09 NOTE — PROGRESS NOTES
Daily Note     Today's date: 10/9/2018  Patient name: Alvin Pisano  : 1970  MRN: [id number]  Referring provider: Tory Gunter MD  Dx:   Encounter Diagnosis     ICD-10-CM    1  Rupture of left proximal hamstring tendon, subsequent encounter S76 312D                   Subjective: patient reports contiued improvement in the L hamstring area  Tenderness is still present though less intense  Objective: See treatment diary below      Assessment: Tolerated treatment well   Patient exhibited good technique with therapeutic exercises      Plan: Continue per plan of care         Precautions: None     Daily Treatment Diary      Manual  9/19  9/24  9/26  10/1  10/5  10/9   Left hamstring stretch RK  LF    LF    LF   Left gluteal and piriformis stretch  RK  LF        LF   Exercise Diary                Elliptical L 1 10 '  L 1 10'  L 1 10'  L 1 10'  L 1 10'  L 1 10'   HEP: SKTC and hamstring stretch supine      HEP         Mini squats      10# KB 3/10  10#KB 3/10  10# 3/10  10# 3/10   Side Stepping               Steps Forward               Steps Lateral  6" 20x  8" 2/10  8" /10  8" 20x  8" 20X  8" 20X   Lunges  walking 20x 5 lb 5# 20X  5# 20X  5# 3/10  walking 20X  5# KB walking 20X   SLS Foam w/ cone touch  30x 20X  30x    30X     SL Deadlift         5# KB 10X  5# KB 10   Ball Bridge, curl  2/10  2/10  2/10  2/10  2/10  2/10                                   Multi-Hip: F6, P3    P 3 5 3/10   P 3 5 3/10  P 4 3/10    P 4 5 all    FLEX/EXT  P 3 5 3/10       P 4 3/10 B  P 4 3/10      ABD/ADD  P 3 5 3/10      P 4 3/10 B  P 4 3/10    Leg Press: S8  P 3 3/10  P 3 3/10  P 4 3/10  P 4 3/10  P 4 3/10  P 4 3/10   XO TKE               PYR QUAD:   S 3  P4 , C 1  P 3 3/10  P 4 3/10  P 4 3/10  P 4 3/10  P 4 3/10  P 4 3/10   PYR HAM: S 8  P5 , C 7  P 4 5 3/10  P 5 5 3/10 P 5 5 3/10  P 6 3/10  P 6 3/10  P 6 3/10                   Modalities               CP      10'    Declined

## 2018-10-11 ENCOUNTER — OFFICE VISIT (OUTPATIENT)
Dept: PHYSICAL THERAPY | Facility: CLINIC | Age: 48
End: 2018-10-11
Payer: COMMERCIAL

## 2018-10-11 DIAGNOSIS — S76.312D RUPTURE OF LEFT PROXIMAL HAMSTRING TENDON, SUBSEQUENT ENCOUNTER: Primary | ICD-10-CM

## 2018-10-11 PROCEDURE — 97140 MANUAL THERAPY 1/> REGIONS: CPT

## 2018-10-11 PROCEDURE — 97010 HOT OR COLD PACKS THERAPY: CPT

## 2018-10-11 PROCEDURE — 97110 THERAPEUTIC EXERCISES: CPT

## 2018-10-11 NOTE — PROGRESS NOTES
Daily Note     Today's date: 10/11/2018  Patient name: Scot Vargas  : 1970  MRN: [id number]  Referring provider: Jacy Medina MD  Dx:   Encounter Diagnosis     ICD-10-CM    1  Rupture of left proximal hamstring tendon, subsequent encounter S76 312D                   Subjective: Patient reports she still feels weakness in the medial proximal hamstring at times  Objective: See treatment diary below      Assessment: Tolerated treatment well  Patient exhibited good technique with therapeutic exercises      Plan: Continue per plan of care       Precautions: None     Daily Treatment Diary      Manual  10/11  9/24  9/26  10/1  10/5  10/9   Left hamstring stretch LF  LF    LF    LF   Left gluteal and piriformis stretch  LF  LF        LF   Exercise Diary                Elliptical L 1 10 '  L 1 10'  L 1 10'  L 1 10'  L 1 10'  L 1 10'   HEP: SKTC and hamstring stretch supine      HEP         Mini squats  10# 3/10    10# KB 3/10  10#KB 3/10  10# 3/10  10# 3/10   Side Stepping               Steps Forward               Steps Lateral  8" 20x  8" 2/10  8" /10  8" 20x  8" 20X  8" 20X   Lunges   5# 20X  5# 20X  5# 3/10  walking 20X  5# KB walking 20X   [facility] Split squat 0# 20X        SLS Foam w/ cone touch   20X  30x    30X     SL Deadlift  5# KB 3/10       5# KB 10X  5# KB 2/10   Ball Bridge, curl  2/10  2/10  2/10  2/10  2/10  2/10    SL Bridge  30X                             Multi-Hip: F6, P3  P 4 5 3/10 all  P 3 5 3/10   P 3 5 3/10  P 4 3/10    P 4 5 all    FLEX/EXT         P 4 3/10 B  P 4 3/10      ABD/ADD        P 4 3/10 B  P 4 3/10     Leg Press: S8  P 5 3/10  P 3 3/10  P 4 3/10  P 4 3/10  P 4 3/10  P 4 3/10   XO TKE               PYR QUAD:   S 3  P4 , C 1  P 4 3/10  P 4 3/10  P 4 3/10  P 4 3/10  P 4 3/10  P 4 3/10   PYR HAM: S 8  P5 , C 7  P 6 3/10  P 5 5 3/10 P 5 5 3/10  P 6 3/10  P 6 3/10  P 6 3/10                   Modalities               CP  10'    10'    Declined

## 2018-10-26 NOTE — PROGRESS NOTES
Discharge Note     Today's date: 10/26/2018  Patient name: Alvin Baezaor  : 1970  MRN: [id number]  Referring provider: Tory Gunter MD  Dx:   Encounter Diagnosis     ICD-10-CM    1  Rupture of left proximal hamstring tendon, subsequent encounter S76 312D        The patient did not show for 2 scheduled visits since she last attended treatment on 10/11/2018  She has not contacted our office for additional treatment and has been discharged from our care  No updated objective measures are available for this report due to self discharge

## 2019-01-09 ENCOUNTER — TRANSCRIBE ORDERS (OUTPATIENT)
Dept: PHYSICAL THERAPY | Facility: CLINIC | Age: 49
End: 2019-01-09

## 2020-10-09 ENCOUNTER — NURSE TRIAGE (OUTPATIENT)
Dept: OTHER | Facility: OTHER | Age: 50
End: 2020-10-09

## 2020-10-09 DIAGNOSIS — Z20.828 SARS-ASSOCIATED CORONAVIRUS EXPOSURE: Primary | ICD-10-CM

## 2020-10-12 DIAGNOSIS — Z20.828 SARS-ASSOCIATED CORONAVIRUS EXPOSURE: ICD-10-CM

## 2020-10-12 PROCEDURE — U0003 INFECTIOUS AGENT DETECTION BY NUCLEIC ACID (DNA OR RNA); SEVERE ACUTE RESPIRATORY SYNDROME CORONAVIRUS 2 (SARS-COV-2) (CORONAVIRUS DISEASE [COVID-19]), AMPLIFIED PROBE TECHNIQUE, MAKING USE OF HIGH THROUGHPUT TECHNOLOGIES AS DESCRIBED BY CMS-2020-01-R: HCPCS | Performed by: FAMILY MEDICINE

## 2020-10-13 LAB — SARS-COV-2 RNA SPEC QL NAA+PROBE: NOT DETECTED

## 2021-02-04 ENCOUNTER — OFFICE VISIT (OUTPATIENT)
Dept: URGENT CARE | Facility: CLINIC | Age: 51
End: 2021-02-04
Payer: COMMERCIAL

## 2021-02-04 VITALS — HEIGHT: 66 IN | BODY MASS INDEX: 22.5 KG/M2 | WEIGHT: 140 LBS

## 2021-02-04 DIAGNOSIS — R68.89 FLU-LIKE SYMPTOMS: Primary | ICD-10-CM

## 2021-02-04 PROCEDURE — 99213 OFFICE O/P EST LOW 20 MIN: CPT | Performed by: NURSE PRACTITIONER

## 2021-02-04 PROCEDURE — 87635 SARS-COV-2 COVID-19 AMP PRB: CPT | Performed by: NURSE PRACTITIONER

## 2021-02-04 NOTE — PATIENT INSTRUCTIONS
Patient Instructions     COVID testing initiated  Results may take up to 5-10 days to return, but often come back sooner (2-4 days)     If the patient has a [facility] My Chart account, results may be accessed on line  If the patient does not have the [employer] Chart account, please establish one so results can be accessed  This will be the easiest and quickest way to get a copy of your test results if you require printed documentation  If patient is symptomatic and until results are obtained, home quarantine / self isolation strongly encouraged  If testing is done for screening purposes and patient is not symptomatic, we still recommend masking, social distancing, good hygiene practices be followed  If COVID test is positive, patient / care giver will be contacted by ordering provider or designated staff  If COVID test is positive, please call the primary care provider office to inform of positive test and request follow up evaluation appointment  (Generally, primary care providers are doing telemedicine visits with their positive COVID patients )  If COVID test is positive, please again review all information below  Further questions may be addressed by the primary care provider or the [address] [place] [address] [place] at [phone]  If the patient / caregiver has not heard about test results or has been unable to access results on the patient My Chart account in a timely fashion, please call the provider's office where test was ordered (or Hot Line if applicable)  to inquire about results  If results are negative and patient / care giver has been found to have already accessed results through the [facility]  [employer] Chart tor, no call will be made  Until results are obtained, home quarantine / self isolation strongly encouraged       If the patient would develop profound weakness, chest pain, shortness of breath please proceed to an emergency room for further evaluation otherwise we do recommend that patient follow-up with their primary care provider in the next 5-7 days if not improving  Symptomatic treatment as needed for symptoms relief based on age / medical status of patient  Things like warm salt water gargles, Tylenol or Ibuprofen (if not contraindicated), drinking plenty of fluids, nasal saline rinses / spray, warm tea with honey (not for patients less than 1 year of age),  etc may provide symptoms relief  [address]    Your healthcare provider and/or public health staff have evaluated you and have determined that you do not need to remain in the hospital at this time  At this time you can be isolated at home where you will be monitored by staff from your local or state health department  You should carefully follow the prevention and isolation steps below until a healthcare provider or local or state health department says that you can return to your normal activities  Stay home except to get medical care    People who are mildly ill with COVID-19 are able to isolate at home during their illness  You should restrict activities outside your home, except for getting medical care  Do not go to work, school, or public areas  Avoid using public transportation, ride-sharing, or taxis  Separate yourself from other people and animals in your home    People: As much as possible, you should stay in a specific room and away from other people in your home  Also, you should use a separate bathroom, if available  Animals: You should restrict contact with pets and other animals while you are sick with COVID-19, just like you would around other people  Although there have not been reports of pets or other animals becoming sick with COVID-19, it is still recommended that people sick with COVID-19 limit contact with animals until more information is known about the virus  When possible, have another member of your household care for your animals while you are sick   If you are sick with COVID-19, avoid contact with your pet, including petting, snuggling, being kissed or licked, and sharing food  If you must care for your pet or be around animals while you are sick, wash your hands before and after you interact with pets and wear a facemask  See COVID-19 and Animals for more information  Call ahead before visiting your doctor    If you have a medical appointment, call the healthcare provider and tell them that you have or may have COVID-19  This will help the healthcare providers office take steps to keep other people from getting infected or exposed  Wear a facemask    You should wear a facemask when you are around other people (e g , sharing a room or vehicle) or pets and before you enter a healthcare providers office  If you are not able to wear a facemask (for example, because it causes trouble breathing), then people who live with you should not stay in the same room with you, or they should wear a facemask if they enter your room  Cover your coughs and sneezes    Cover your mouth and nose with a tissue when you cough or sneeze  Throw used tissues in a lined trash can  Immediately wash your hands with soap and water for at least 20 seconds or, if soap and water are not available, clean your hands with an alcohol-based hand  that contains at least 60% alcohol  Clean your hands often    Wash your hands often with soap and water for at least 20 seconds, especially after blowing your nose, coughing, or sneezing; going to the bathroom; and before eating or preparing food  If soap and water are not readily available, use an alcohol-based hand  with at least 60% alcohol, covering all surfaces of your hands and rubbing them together until they feel dry  Soap and water are the best option if hands are visibly dirty  Avoid touching your eyes, nose, and mouth with unwashed hands      Avoid sharing personal household items    You should not share dishes, drinking glasses, cups, eating utensils, towels, or bedding with other people or pets in your home  After using these items, they should be washed thoroughly with soap and water  Clean all high-touch surfaces everyday    High touch surfaces include counters, tabletops, doorknobs, bathroom fixtures, toilets, phones, keyboards, tablets, and bedside tables  Also, clean any surfaces that may have blood, stool, or body fluids on them  Use a household cleaning spray or wipe, according to the label instructions  Labels contain instructions for safe and effective use of the cleaning product including precautions you should take when applying the product, such as wearing gloves and making sure you have good ventilation during use of the product  Monitor your symptoms    Seek prompt medical attention if your illness is worsening (e g , difficulty breathing)  Before seeking care, call your healthcare provider and tell them that you have, or are being evaluated for, COVID-19  Put on a facemask before you enter the facility  These steps will help the healthcare providers office to keep other people in the office or waiting room from getting infected or exposed  Ask your healthcare provider to call the local or [facility] health department  Persons who are placed under active monitoring or facilitated self-monitoring should follow instructions provided by their local health department or occupational health professionals, as appropriate  If you have a medical emergency and need to call 911, notify the dispatch personnel that you have, or are being evaluated for COVID-19  If possible, put on a facemask before emergency medical services arrive      Discontinuing home isolation    Patients with confirmed COVID-19 should remain under home isolation precautions until the following conditions are met:   - They have had no fever for at least 24 hours (that is one full day of no fever without the use medicine that reduces fevers)  AND  - other symptoms have improved (for example, when their cough or shortness of breath have improved)  AND  - If had mild or moderate illness, at least 10 days have passed since their symptoms first appeared or if severe illness (needed oxygen) or immunosuppressed, at least 20 days have passed since symptoms first appeared  Patients with confirmed COVID-19 should also notify close contacts (including their workplace) and ask that they self-quarantine  Currently, close contact is defined as being within 6 feet for 15 minutes or more from the period 24 hours starting 48 hours before symptom onset to the time at which the patient went into isolation  Close contacts of patients diagnosed with COVID-19 should be instructed by the patient to self-quarantine for 14 days from the last time of their last contact with the patient       Source: [employer]

## 2021-02-04 NOTE — PROGRESS NOTES
[employer] [facility]        NAME: Adriana Friend is a 52 y o  female  : 1970    MRN: [id number]  DATE: 2021  TIME: 10:01 AM    Assessment and Plan   Flu-like symptoms [R68 89]  1  Flu-like symptoms  Novel Coronavirus (Covid-19),PCR [facility] - Office Collection     covid swab done -  Patient Instructions     COVID testing initiated  Results may take up to 5-10 days to return, but often come back sooner (2-4 days)     If the patient has a [facility] My Chart account, results may be accessed on line  If the patient does not have the [employer] Chart account, please establish one so results can be accessed  This will be the easiest and quickest way to get a copy of your test results if you require printed documentation  If patient is symptomatic and until results are obtained, home quarantine / self isolation strongly encouraged  If testing is done for screening purposes and patient is not symptomatic, we still recommend masking, social distancing, good hygiene practices be followed  If COVID test is positive, patient / care giver will be contacted by ordering provider or designated staff  If COVID test is positive, please call the primary care provider office to inform of positive test and request follow up evaluation appointment  (Generally, primary care providers are doing telemedicine visits with their positive COVID patients )  If COVID test is positive, please again review all information below  Further questions may be addressed by the primary care provider or the [address] [place], [place] [address]d at [phone]  If the patient / caregiver has not heard about test results or has been unable to access results on the patient My Chart account in a timely fashion, please call the provider's office where test was ordered (or Hot Line if applicable)  to inquire about results         If results are negative and patient / care giver has been found to have already accessed results through the "[employer] Chart tor, no call will be made  Until results are obtained, home quarantine / self isolation strongly encouraged  If the patient would develop profound weakness, chest pain, shortness of breath please proceed to an emergency room for further evaluation otherwise we do recommend that patient follow-up with their primary care provider in the next 5-7 days if not improving  Symptomatic treatment as needed for symptoms relief based on age / medical status of patient  Things like warm salt water gargles, Tylenol or Ibuprofen (if not contraindicated), drinking plenty of fluids, nasal saline rinses / spray, warm tea with honey (not for patients less than 1 year of age),  etc may provide symptoms relief  Patient Instructions     Follow up with PCP in 3-5 days  Proceed to  ER if symptoms worsen  Chief Complaint     Chief Complaint   Patient presents with    COVID-19     cough, chills, fever over 100, nausea  exposed 5 days  History of Present Illness   Matt Orellana presents to the clinic c/o    cough, chills, fever over 100, nausea  exposed 5 days  Was exposed to a positive person on sat  The individual became symptomatic on Sunday and tested positive on tues  She started with symptoms yesterday  Concerned because  is high risk  Review of Systems   Review of Systems   All other systems reviewed and are negative  Current Medications     No long-term medications on file         Current Allergies     Allergies as of 02/04/2021 - Reviewed 09/19/2018   Allergen Reaction Noted    Sulfa antibiotics Rash 02/04/2021            The following portions of the patient's history were reviewed and updated as appropriate: allergies, current medications, past family history, past medical history, past social history, past surgical history and problem list     Objective   Ht 5' 6" (1 676 m)   Wt 63 5 kg (140 lb)   BMI 22 60 kg/m²        Physical Exam Physical Exam  Vitals signs and nursing note reviewed  Constitutional:       Appearance: Normal appearance  She is well-developed  HENT:      Head: Normocephalic and atraumatic  Eyes:      General: Lids are normal       Conjunctiva/sclera: Conjunctivae normal    Cardiovascular:      Rate and Rhythm: Normal rate and regular rhythm  Heart sounds: Normal heart sounds, S1 normal and S2 normal    Pulmonary:      Effort: Pulmonary effort is normal       Breath sounds: Normal breath sounds  Skin:     General: Skin is warm and dry  Neurological:      Mental Status: She is alert and oriented to person, place, and time  Psychiatric:         Speech: Speech normal          Behavior: Behavior normal  Behavior is cooperative  Thought Content:  Thought content normal          Judgment: Judgment normal

## 2021-02-06 LAB — SARS-COV-2 RNA RESP QL NAA+PROBE: POSITIVE

## 2021-04-05 DIAGNOSIS — Z23 ENCOUNTER FOR IMMUNIZATION: ICD-10-CM

## 2022-01-02 ENCOUNTER — OFFICE VISIT (OUTPATIENT)
Dept: URGENT CARE | Facility: CLINIC | Age: 52
End: 2022-01-02
Payer: COMMERCIAL

## 2022-01-02 VITALS
RESPIRATION RATE: 20 BRPM | TEMPERATURE: 98.6 F | HEART RATE: 82 BPM | HEIGHT: 65 IN | BODY MASS INDEX: 20.83 KG/M2 | OXYGEN SATURATION: 98 % | WEIGHT: 125 LBS

## 2022-01-02 DIAGNOSIS — R68.89 FLU-LIKE SYMPTOMS: Primary | ICD-10-CM

## 2022-01-02 PROCEDURE — 87636 SARSCOV2 & INF A&B AMP PRB: CPT | Performed by: EMERGENCY MEDICINE

## 2022-01-02 PROCEDURE — 99213 OFFICE O/P EST LOW 20 MIN: CPT | Performed by: EMERGENCY MEDICINE

## 2022-01-02 NOTE — PATIENT INSTRUCTIONS
You have been diagnosed with a flu-like illness, and your symptoms should resolve over the next 7 to 10 days with the treatments recommended today  If they do not, it is possible that you have developed a bacterial infection and you should return  If you were to take an antibiotic while you are still in the viral stage, you will not get better any faster, but could kill off good germs in your body as well as make germs resistant to the antibiotic  Take an expectorant - guaifenesin should be the only ingredient - during the day, and the cough suppressant (ex  Robitussin DM or Tessalon) if needed at night only  Take Zinc 50 mg every 12 hours for the next week  You should also take Quercetin 500 mg twice daily with it  You should also take vitamin D3 5000 i u s per day for the next 1 week, and vitamin-C 1 g daily  May take Flonase as discussed  You may also take a decongestant like Sudafed, unless you have hypertension or cardiac disease  You may take Imodium for diarrhea according to package instructions  Flu-like illness   AMBULATORY CARE:   Flu-like illness is an infection caused by a virus  The flu is easily spread when an infected person coughs, sneezes, or has close contact with others  You may be able to spread the flu to others for 1 week or longer after signs or symptoms appear  Common signs and symptoms include the following:   · Fever and chills    · Headaches, body aches, and muscle or joint pain    · Cough, runny nose, and sore throat    · Loss of appetite, nausea, vomiting, or diarrhea    · Tiredness    · Trouble breathing  Call 911 for any of the following:   · You have trouble breathing, and your lips look purple or blue  · You have a seizure  Seek care immediately if:   · You are dizzy, or you are urinating less or not at all  · You have a headache with a stiff neck, and you feel tired or confused      · You have new pain or pressure in your chest     · Your symptoms, such as shortness of breath, vomiting, or diarrhea, get worse  · Your symptoms, such as fever and coughing, seem to get better, but then get worse  Contact your healthcare provider if:   · You have new muscle pain or weakness  · You have questions or concerns about your condition or care  Treatment for influenza  may include any of the following:  · Acetaminophen decreases pain and fever  It is available without a doctor's order  Ask how much to take and how often to take it  Follow directions  Acetaminophen can cause liver damage if not taken correctly  · NSAIDs  such as ibuprofen, help decrease swelling, pain, and fever  This medicine is available with or without a doctor's order  NSAIDs can cause stomach bleeding or kidney problems in certain people  If you take blood thinner medicine, always ask your healthcare provider if NSAIDs are safe for you  Always read the medicine label and follow directions  · Antivirals  help fight a viral infection  Manage your symptoms:   · Rest  as much as you can to help you recover  · Drink liquids as directed  to help prevent dehydration  Ask how much liquid to drink each day and which liquids are best for you  Prevent the spread of the flu:   · Wash your hands often  Use soap and water  Wash your hands after you use the bathroom, change a child's diapers, or sneeze  Wash your hands before you prepare or eat food  Use gel hand cleanser when soap and water are not available  Do not touch your eyes, nose, or mouth unless you have washed your hands first        · Cover your mouth when you sneeze or cough  Cough into a tissue or the bend of your arm  · Clean shared items with a germ-killing   Clean table surfaces, doorknobs, and light switches  Do not share towels, silverware, and dishes with people who are sick  Wash bed sheets, towels, silverware, and dishes with soap and water       · Wear a mask  over your mouth and nose if you are sick or are near anyone who is sick  · Stay away from others  if you are sick  · Influenza vaccine  helps prevent influenza (flu)  Everyone older than 6 months should get a yearly influenza vaccine  Get the vaccine as soon as it is available, usually in September or October each year  Follow up with your healthcare provider as directed:  Write down your questions so you remember to ask them during your visits  © 2017 [address] Information is for End User's use only and may not be sold, redistributed or otherwise used for commercial purposes  All illustrations and images included in CareNotes® are the copyrighted property of [employer]  or Mike Casaerz  The above information is an  only  It is not intended as medical advice for individual conditions or treatments  Talk to your doctor, nurse or pharmacist before following any medical regimen to see if it is safe and effective for you  [address]     Your healthcare provider and/or public health staff have evaluated you and have determined that you do not need to be hospitalized at this time  At this time you can be isolated at home where you will be monitored by staff from your local or state health department  You should carefully follow the prevention and isolation steps below until a healthcare provider or local or state health department says that you can return to your normal activities  Stay home except to get medical care     People who are mildly ill with COVID-19 are able to isolate at home during their illness  You should restrict activities outside your home, except for getting medical care  Do not go to work, school, or public areas  Avoid using public transportation, ride-sharing, or taxis  Separate yourself from other people and animals in your home     People: As much as possible, you should stay in a specific room and away from other people in your home   Also, you should use a separate bathroom, if available  Animals: You should restrict contact with pets and other animals while you are sick with COVID-19, just like you would around other people  Although there have not been reports of pets or other animals becoming sick with COVID-19, it is still recommended that people sick with COVID-19 limit contact with animals until more information is known about the virus  When possible, have another member of your household care for your animals while you are sick  If you are sick with COVID-19, avoid contact with your pet, including petting, snuggling, being kissed or licked, and sharing food  If you must care for your pet or be around animals while you are sick, wash your hands before and after you interact with pets and wear a facemask  See COVID-19 and Animals for more information  Call ahead before visiting your doctor     If you have a medical appointment, call the healthcare provider and tell them that you have or may have COVID-19  This will help the healthcare providers office take steps to keep other people from getting infected or exposed  Wear a facemask     You should wear a facemask when you are around other people (e g , sharing a room or vehicle) or pets and before you enter a healthcare providers office  If you are not able to wear a facemask (for example, because it causes trouble breathing), then people who live with you should not stay in the same room with you, or they should wear a facemask if they enter your room  Cover your coughs and sneezes     Cover your mouth and nose with a tissue when you cough or sneeze  Throw used tissues in a lined trash can  Immediately wash your hands with soap and water for at least 20 seconds or, if soap and water are not available, clean your hands with an alcohol-based hand  that contains at least 60% alcohol       Clean your hands often     Wash your hands often with soap and water for at least 20 seconds, especially after blowing your nose, coughing, or sneezing; going to the bathroom; and before eating or preparing food  If soap and water are not readily available, use an alcohol-based hand  with at least 60% alcohol, covering all surfaces of your hands and rubbing them together until they feel dry  Soap and water are the best option if hands are visibly dirty  Avoid touching your eyes, nose, and mouth with unwashed hands  Avoid sharing personal household items     You should not share dishes, drinking glasses, cups, eating utensils, towels, or bedding with other people or pets in your home  After using these items, they should be washed thoroughly with soap and water  Clean all high-touch surfaces everyday     High touch surfaces include counters, tabletops, doorknobs, bathroom fixtures, toilets, phones, keyboards, tablets, and bedside tables  Also, clean any surfaces that may have blood, stool, or body fluids on them  Use a household cleaning spray or wipe, according to the label instructions  Labels contain instructions for safe and effective use of the cleaning product including precautions you should take when applying the product, such as wearing gloves and making sure you have good ventilation during use of the product  Monitor your symptoms     Seek prompt medical attention if your illness is worsening (e g , difficulty breathing)  Before seeking care, call your healthcare provider and tell them that you have, or are being evaluated for, COVID-19  Put on a facemask before you enter the facility  These steps will help the healthcare providers office to keep other people in the office or waiting room from getting infected or exposed  Ask your healthcare provider to call the local or [facility] health department   Persons who are placed under active monitoring or facilitated self-monitoring should follow instructions provided by their local health department or occupational health professionals, as appropriate  If you have a medical emergency and need to call 911, notify the dispatch personnel that you have, or are being evaluated for COVID-19  If possible, put on a facemask before emergency medical services arrive  Discontinuing home isolation     Patients with confirmed COVID-19 should remain under home isolation precautions until the risk of secondary transmission to others is thought to be low  The decision to discontinue home isolation precautions should be made on a case-by-case basis, in consultation with healthcare providers and state and local health departments       Source: [employer]

## 2022-01-02 NOTE — PROGRESS NOTES
[facility]        NAME: Daphne Land is a 54 y o  female  : 1970    MRN: [id number]  DATE: 2022  TIME: 11:27 AM    Assessment and Plan   Flu-like symptoms [R68 89]  1  Flu-like symptoms  COVID/FLU- Office Collect         Patient Instructions     Patient Instructions     You have been diagnosed with a flu-like illness, and your symptoms should resolve over the next 7 to 10 days with the treatments recommended today  If they do not, it is possible that you have developed a bacterial infection and you should return  If you were to take an antibiotic while you are still in the viral stage, you will not get better any faster, but could kill off good germs in your body as well as make germs resistant to the antibiotic  Take an expectorant - guaifenesin should be the only ingredient - during the day, and the cough suppressant (ex  Robitussin DM or Tessalon) if needed at night only  Take Zinc 50 mg every 12 hours for the next week  You should also take Quercetin 500 mg twice daily with it  You should also take vitamin D3 5000 i u s per day for the next 1 week, and vitamin-C 1 g daily  May take Flonase as discussed  You may also take a decongestant like Sudafed, unless you have hypertension or cardiac disease  You may take Imodium for diarrhea according to package instructions  Flu-like illness   AMBULATORY CARE:   Flu-like illness is an infection caused by a virus  The flu is easily spread when an infected person coughs, sneezes, or has close contact with others  You may be able to spread the flu to others for 1 week or longer after signs or symptoms appear     Common signs and symptoms include the following:   · Fever and chills    · Headaches, body aches, and muscle or joint pain    · Cough, runny nose, and sore throat    · Loss of appetite, nausea, vomiting, or diarrhea    · Tiredness    · Trouble breathing  Call 911 for any of the following:   · You have trouble breathing, and your lips look purple or blue  · You have a seizure  Seek care immediately if:   · You are dizzy, or you are urinating less or not at all  · You have a headache with a stiff neck, and you feel tired or confused  · You have new pain or pressure in your chest     · Your symptoms, such as shortness of breath, vomiting, or diarrhea, get worse  · Your symptoms, such as fever and coughing, seem to get better, but then get worse  Contact your healthcare provider if:   · You have new muscle pain or weakness  · You have questions or concerns about your condition or care  Treatment for influenza  may include any of the following:  · Acetaminophen decreases pain and fever  It is available without a doctor's order  Ask how much to take and how often to take it  Follow directions  Acetaminophen can cause liver damage if not taken correctly  · NSAIDs  such as ibuprofen, help decrease swelling, pain, and fever  This medicine is available with or without a doctor's order  NSAIDs can cause stomach bleeding or kidney problems in certain people  If you take blood thinner medicine, always ask your healthcare provider if NSAIDs are safe for you  Always read the medicine label and follow directions  · Antivirals  help fight a viral infection  Manage your symptoms:   · Rest  as much as you can to help you recover  · Drink liquids as directed  to help prevent dehydration  Ask how much liquid to drink each day and which liquids are best for you  Prevent the spread of the flu:   · Wash your hands often  Use soap and water  Wash your hands after you use the bathroom, change a child's diapers, or sneeze  Wash your hands before you prepare or eat food  Use gel hand cleanser when soap and water are not available  Do not touch your eyes, nose, or mouth unless you have washed your hands first        · Cover your mouth when you sneeze or cough  Cough into a tissue or the bend of your arm      · Clean shared items with a germ-killing   Clean table surfaces, doorknobs, and light switches  Do not share towels, silverware, and dishes with people who are sick  Wash bed sheets, towels, silverware, and dishes with soap and water  · Wear a mask  over your mouth and nose if you are sick or are near anyone who is sick  · Stay away from others  if you are sick  · Influenza vaccine  helps prevent influenza (flu)  Everyone older than 6 months should get a yearly influenza vaccine  Get the vaccine as soon as it is available, usually in September or October each year  Follow up with your healthcare provider as directed:  Write down your questions so you remember to ask them during your visits  © 2017 [address] Information is for End User's use only and may not be sold, redistributed or otherwise used for commercial purposes  All illustrations and images included in CareNotes® are the copyrighted property of [employer]  or Mike Casarez  The above information is an  only  It is not intended as medical advice for individual conditions or treatments  Talk to your doctor, nurse or pharmacist before following any medical regimen to see if it is safe and effective for you  [address]     Your healthcare provider and/or public health staff have evaluated you and have determined that you do not need to be hospitalized at this time  At this time you can be isolated at home where you will be monitored by staff from your local or state health department  You should carefully follow the prevention and isolation steps below until a healthcare provider or local or state health department says that you can return to your normal activities  Stay home except to get medical care     People who are mildly ill with COVID-19 are able to isolate at home during their illness   You should restrict activities outside your home, except for getting medical care  Do not go to work, school, or public areas  Avoid using public transportation, ride-sharing, or taxis  Separate yourself from other people and animals in your home     People: As much as possible, you should stay in a specific room and away from other people in your home  Also, you should use a separate bathroom, if available  Animals: You should restrict contact with pets and other animals while you are sick with COVID-19, just like you would around other people  Although there have not been reports of pets or other animals becoming sick with COVID-19, it is still recommended that people sick with COVID-19 limit contact with animals until more information is known about the virus  When possible, have another member of your household care for your animals while you are sick  If you are sick with COVID-19, avoid contact with your pet, including petting, snuggling, being kissed or licked, and sharing food  If you must care for your pet or be around animals while you are sick, wash your hands before and after you interact with pets and wear a facemask  See COVID-19 and Animals for more information  Call ahead before visiting your doctor     If you have a medical appointment, call the healthcare provider and tell them that you have or may have COVID-19  This will help the healthcare providers office take steps to keep other people from getting infected or exposed  Wear a facemask     You should wear a facemask when you are around other people (e g , sharing a room or vehicle) or pets and before you enter a healthcare providers office  If you are not able to wear a facemask (for example, because it causes trouble breathing), then people who live with you should not stay in the same room with you, or they should wear a facemask if they enter your room  Cover your coughs and sneezes     Cover your mouth and nose with a tissue when you cough or sneeze  Throw used tissues in a lined trash can  Immediately wash your hands with soap and water for at least 20 seconds or, if soap and water are not available, clean your hands with an alcohol-based hand  that contains at least 60% alcohol  Clean your hands often     Wash your hands often with soap and water for at least 20 seconds, especially after blowing your nose, coughing, or sneezing; going to the bathroom; and before eating or preparing food  If soap and water are not readily available, use an alcohol-based hand  with at least 60% alcohol, covering all surfaces of your hands and rubbing them together until they feel dry  Soap and water are the best option if hands are visibly dirty  Avoid touching your eyes, nose, and mouth with unwashed hands  Avoid sharing personal household items     You should not share dishes, drinking glasses, cups, eating utensils, towels, or bedding with other people or pets in your home  After using these items, they should be washed thoroughly with soap and water  Clean all high-touch surfaces everyday     High touch surfaces include counters, tabletops, doorknobs, bathroom fixtures, toilets, phones, keyboards, tablets, and bedside tables  Also, clean any surfaces that may have blood, stool, or body fluids on them  Use a household cleaning spray or wipe, according to the label instructions  Labels contain instructions for safe and effective use of the cleaning product including precautions you should take when applying the product, such as wearing gloves and making sure you have good ventilation during use of the product  Monitor your symptoms     Seek prompt medical attention if your illness is worsening (e g , difficulty breathing)  Before seeking care, call your healthcare provider and tell them that you have, or are being evaluated for, COVID-19  Put on a facemask before you enter the facility   These steps will help the healthcare providers office to keep other people in the office or waiting room from getting infected or exposed  Ask your healthcare provider to call the local or state health department  Persons who are placed under active monitoring or facilitated self-monitoring should follow instructions provided by their local health department or occupational health professionals, as appropriate  If you have a medical emergency and need to call 911, notify the dispatch personnel that you have, or are being evaluated for COVID-19  If possible, put on a facemask before emergency medical services arrive  Discontinuing home isolation     Patients with confirmed COVID-19 should remain under home isolation precautions until the risk of secondary transmission to others is thought to be low  The decision to discontinue home isolation precautions should be made on a case-by-case basis, in consultation with healthcare providers and [facility] and [facility] health departments  Source: [employer]         Follow up with PCP in 3-5 days  Proceed to  ER if symptoms worsen  Chief Complaint     Chief Complaint   Patient presents with    COVID-19     HA, Fever, Congestion, and Sore Throat         History of Present Illness       Patient complains headaches, sore throat, cough, congestion for the past day  Review of Systems   Review of Systems   Constitutional: Negative for appetite change, chills, fatigue and fever  HENT: Positive for congestion, rhinorrhea, sinus pressure and sore throat  Negative for trouble swallowing and voice change  Respiratory: Positive for cough  Negative for chest tightness, shortness of breath and wheezing  Cardiovascular: Negative for chest pain  Gastrointestinal: Negative for nausea  Musculoskeletal: Negative for myalgias  Neurological: Positive for headaches  Current Medications     No current outpatient medications on file      Current Allergies     Allergies as of 01/02/2022 - Reviewed 01/02/2022   Allergen Reaction Noted    Sulfa antibiotics Rash 02/04/2021            The following portions of the patient's history were reviewed and updated as appropriate: allergies, current medications, past family history, past medical history, past social history, past surgical history and problem list      Past Medical History:   Diagnosis Date    Known health problems: none        Past Surgical History:   Procedure Laterality Date    NO PAST SURGERIES         History reviewed  No pertinent family history  Medications have been verified  Objective   Ht 5' 5" (1 651 m)   Wt 56 7 kg (125 lb)   BMI 20 80 kg/m²        Physical Exam     Physical Exam  Vitals and nursing note reviewed  Constitutional:       General: She is not in acute distress  Appearance: She is well-developed  HENT:      Head: Normocephalic and atraumatic  Nose: Mucosal edema and congestion present  Mouth/Throat:      Pharynx: Oropharynx is clear  Posterior oropharyngeal erythema present  No oropharyngeal exudate  Tonsils: No tonsillar abscesses  Cardiovascular:      Rate and Rhythm: Normal rate and regular rhythm  Heart sounds: Normal heart sounds  No murmur heard  No friction rub  No gallop  Pulmonary:      Effort: Pulmonary effort is normal  No respiratory distress  Breath sounds: No wheezing or rales  Musculoskeletal:      Cervical back: Neck supple  Skin:     General: Skin is warm and dry  Neurological:      Mental Status: She is alert and oriented to person, place, and time  Psychiatric:         Mood and Affect: Mood normal          Behavior: Behavior normal          Thought Content:  Thought content normal          Judgment: Judgment normal

## 2022-01-06 LAB
FLUAV RNA RESP QL NAA+PROBE: NEGATIVE
FLUBV RNA RESP QL NAA+PROBE: NEGATIVE
SARS-COV-2 RNA RESP QL NAA+PROBE: POSITIVE

## 2022-01-07 ENCOUNTER — TELEPHONE (OUTPATIENT)
Dept: URGENT CARE | Facility: CLINIC | Age: 52
End: 2022-01-07